# Patient Record
Sex: FEMALE | Race: OTHER | HISPANIC OR LATINO | Employment: FULL TIME | URBAN - METROPOLITAN AREA
[De-identification: names, ages, dates, MRNs, and addresses within clinical notes are randomized per-mention and may not be internally consistent; named-entity substitution may affect disease eponyms.]

---

## 2019-03-21 ENCOUNTER — OFFICE VISIT (OUTPATIENT)
Dept: URGENT CARE | Facility: MEDICAL CENTER | Age: 34
End: 2019-03-21

## 2019-03-21 VITALS
OXYGEN SATURATION: 98 % | RESPIRATION RATE: 16 BRPM | SYSTOLIC BLOOD PRESSURE: 140 MMHG | WEIGHT: 146 LBS | DIASTOLIC BLOOD PRESSURE: 74 MMHG | TEMPERATURE: 97 F | HEART RATE: 86 BPM

## 2019-03-21 DIAGNOSIS — R05.9 COUGH: ICD-10-CM

## 2019-03-21 DIAGNOSIS — J06.9 ACUTE URI: Primary | ICD-10-CM

## 2019-03-21 PROCEDURE — G0383 LEV 4 HOSP TYPE B ED VISIT: HCPCS | Performed by: NURSE PRACTITIONER

## 2019-03-21 RX ORDER — LORATADINE 10 MG/1
10 TABLET ORAL DAILY
COMMUNITY

## 2019-03-21 RX ORDER — AMOXICILLIN 500 MG/1
500 CAPSULE ORAL EVERY 12 HOURS SCHEDULED
COMMUNITY

## 2019-03-21 RX ORDER — BROMPHENIRAMINE MALEATE, PSEUDOEPHEDRINE HYDROCHLORIDE, AND DEXTROMETHORPHAN HYDROBROMIDE 2; 30; 10 MG/5ML; MG/5ML; MG/5ML
10 SYRUP ORAL 4 TIMES DAILY PRN
Qty: 120 ML | Refills: 0 | Status: SHIPPED | OUTPATIENT
Start: 2019-03-21

## 2019-03-21 NOTE — PROGRESS NOTES
[facility]        NAME: Princess Hernandez is a 26 y o  female  : 1985    MRN: [id number]  DATE: 2019  TIME: 12:43 PM    Assessment and Plan   Acute URI [J06 9]  1  Acute URI     2  Cough  brompheniramine-pseudoephedrine-DM 30-2-10 MG/5ML syrup         Patient Instructions     May alternate Tylenol and Ibuprofen as needed  Encourage fluids and rest    Saline nasal spray as needed  Humidify bedroom  Salt water gargles  Chloraseptic spray and lozenges as needed  Follow up with PCP in 3-5 days  Proceed to  ER if symptoms worsen  Chief Complaint     Chief Complaint   Patient presents with    Cold Like Symptoms     Patient relates has been sick for 3 days with cough, congestion, and nasal congestion  Denies fever  Started taking Amoxicillin 500 mg BID  Obtained antibiotic from [facility]  Taking Claritin 10 m daily  States, antibiotic is not working  History of Present Illness       Interpretor #[id number] used  Patient presents in office with "having problems breathing"  Also noting cold symptoms x 3 days, including yellow mucus, chest congestion, and feels tired with breathing  Decreased smell and no taste in mouth  Denies fevers, n/V/D   + body aches, mild in back + chills  No history of asthma  Nonsmoker  No seasonal allergies  Has been taking loratadine for sinus congestion  No flu vaccine this season  Has been in the [facility] for past 10 days  Has also been taking amoxicillin though reports it is not helping  Is a permanent resident in the [facility] now  Review of Systems   Review of Systems   Constitutional: Negative for fever  HENT: Positive for congestion and rhinorrhea  Negative for ear pain, sinus pressure, sinus pain and sore throat (Initially though resolved)  Respiratory: Positive for cough and shortness of breath  Negative for chest tightness and wheezing  Cardiovascular: Negative  Gastrointestinal: Negative      Musculoskeletal: Positive for myalgias  Skin: Negative for rash and wound  Current Medications       Current Outpatient Medications:     amoxicillin (AMOXIL) 500 mg capsule, Take 500 mg by mouth every 12 (twelve) hours, Disp: , Rfl:     loratadine (CLARITIN) 10 mg tablet, Take 10 mg by mouth daily, Disp: , Rfl:     brompheniramine-pseudoephedrine-DM 30-2-10 MG/5ML syrup, Take 10 mL by mouth 4 (four) times a day as needed for cough, Disp: 120 mL, Rfl: 0    Current Allergies     Allergies as of 03/21/2019 - Reviewed 03/21/2019   Allergen Reaction Noted    Dexamethasone Swelling 03/21/2019            The following portions of the patient's history were reviewed and updated as appropriate: allergies, current medications, past family history, past medical history, past social history, past surgical history and problem list      History reviewed  No pertinent past medical history  History reviewed  No pertinent surgical history  No family history on file  Medications have been verified  Objective   /74   Pulse 86   Temp (!) 97 °F (36 1 °C) (Tympanic)   Resp 16   Wt 66 2 kg (146 lb)   LMP 03/06/2019   SpO2 98%        Physical Exam     Physical Exam   Constitutional: She is oriented to person, place, and time  Vital signs are normal  She appears well-developed and well-nourished  She is cooperative  Non-toxic appearance  She does not have a sickly appearance  She does not appear ill  No distress  HENT:   Head: Normocephalic and atraumatic  Right Ear: Hearing, tympanic membrane, external ear and ear canal normal    Left Ear: Hearing, tympanic membrane, external ear and ear canal normal    Nose: Rhinorrhea present  No mucosal edema or sinus tenderness  Right sinus exhibits no maxillary sinus tenderness and no frontal sinus tenderness  Left sinus exhibits no maxillary sinus tenderness and no frontal sinus tenderness     Mouth/Throat: Uvula is midline, oropharynx is clear and moist and mucous membranes are normal  Normal dentition  No oropharyngeal exudate  Eyes: Pupils are equal, round, and reactive to light  Conjunctivae, EOM and lids are normal  Right eye exhibits no discharge  Left eye exhibits no discharge  Neck: Trachea normal and normal range of motion  No thyroid mass and no thyromegaly present  Cardiovascular: Normal rate, regular rhythm, S1 normal, S2 normal, normal heart sounds, intact distal pulses and normal pulses  Exam reveals no gallop and no friction rub  No murmur heard  Pulmonary/Chest: Effort normal and breath sounds normal  No respiratory distress  She has no wheezes  She has no rhonchi  She has no rales  She exhibits no tenderness  Musculoskeletal: Normal range of motion  She exhibits no edema  Lymphadenopathy:     She has no cervical adenopathy  Neurological: She is alert and oriented to person, place, and time  Skin: Skin is warm and dry  No rash noted  She is not diaphoretic  Psychiatric: She has a normal mood and affect  Her behavior is normal  Thought content normal    Nursing note and vitals reviewed

## 2019-03-21 NOTE — PATIENT INSTRUCTIONS
May alternate Tylenol and Ibuprofen as needed  Encourage fluids and rest    Saline nasal spray as needed  Humidify bedroom  Salt water gargles  Chloraseptic spray and lozenges as needed  F/U with PCP if symptoms persist/worsen or go to nearest emergency department if any signs of distress  Síntomas de resfriado   LO QUE NECESITA SABER:   Un resfriado es choco infección causada por un virus  La infección causa la inflamación del sistema respiratorio superior  Algunos síntomas comunes de un resfriado incluyen estornudos, garganta seca, congestión nasal, dolor de tong, ojos llorosos, y tos  La tos podría ser seca o incluso contener flemas  Usted también podría sentir demarco musculares, dolor en las articulaciones y cansancio  La fiebre no es un síntoma común del resfrío shayan podría suceder  La mayoría de los resfriados se [place] sin tratamiento  INSTRUCCIONES SOBRE EL VIRGIE HOSPITALARIA:   Regrese a la carlos de emergencias si:   · Usted siente más cansancio y debilidad  · Usted no puede comer  · Crandall corazón está latiendo [employer] rápido de lo normal en crandall avis  · Usted nota manchas george en la parte de atrás de crandall garganta y crandall faina está inflamado y adolorido al tacto  · Usted nota puntos rojos o morados pequeños o grandes en crandall piel  Pregúntele a crandall Ardie Shira vitaminas y minerales son adecuados para usted  · Usted tiene fiebre por encima de los 102°F (38 9°C)  · Usted tiene falta de la aire que es reciente o que MELLISSA  · Usted tiene choco secreción espesa saliéndole de la nariz por más de 2 días  · Sherlyn síntomas no mejoran o más aisha empeoran en cuestión de 5 días  · Usted tiene preguntas o inquietudes acerca de crandall condición o cuidado  Medicamentos:  Los [employer]  podrían  ser parte de los medicamentos sugeridos por crandall médico para disminuir los síntomas de crandall resfrío  Estos medicamentos están disponibles sin receta médica   Pregunte cuáles medicamentos renata y cuándo tomarlos  [facility]  · Los AINEs o el acetaminofén  ayudan a bajar la fiebre o disminuir el dolor  Los descongestionantes    · Los descongestionantes  ayudan a disminuir la congestión nasal      · Los antihistamínicos  ayudan a disminuir los estornudos y el flujo nasal      · Los jarabes para la tos  ayudan a disminuir la tos  · Los expectorantes  ayudan a aflojar las flemas para que las pueda toser  · [place] rae medicamentos félix se le haya indicado  Consulte con crandall médico si usted jabier que crandall medicamento no le está ayudando o si presenta efectos secundarios  Infórmele si es alérgico a algún medicamento  Mantenga choco lista actualizada de los [place], las vitaminas y los productos herbales que anshul  Incluya los siguientes datos de los medicamentos: cantidad, frecuencia y motivo de administración  Traiga con usted la lista o los envases de la píldoras a rae citas de seguimiento  Lleve la lista de los medicamentos con usted en avis de choco emergencia  Alivio de los síntomas:  Los siguientes podría ayudar a aliviar los síntomas del resfrío, félix por ejemplo la garganta seca y la congestión:  · Gárgaras con enjuague bucal o agua salada tibia según las instrucciones  · Pastillas para la garganta o golosinas duras  · Vaporizador de stephanie fría o tibia o humidificador para aliviar la respiración  · [place] de por lo menos 2 días y luego según lo necesario para ir eliminando el cansancio y la debilidad  · Póngase crema a base de petrolato alrededor de las fosas nasales para disminuir la irritación causada por estarse limpiando tanto la nariz  [place] líquidos:  Los líquidos le ayudarán a aflojar y disolver la mucosidad espesa para que la pueda expectorar  Los líquidos también lo mantendrán hidratado  Pregúntele a crandall médico cuáles líquidos le recomienda y cuánta cantidad debe renata cada día    Prevenga la propagación de gérmenes:  Es posible propagar los gérmenes del resfriado a otras personas por lo menos por 3 días después de [employer]  Lávese las joshua frecuentemente  No comparta artículos, félix utensilios de cocina  Cúbrase la nariz y la boca cuando tose o estornuda usando la parte de adentro del codo en vez de las joshua  Tire en la basura las toallas desechables que usó para limpiarse la nariz  No fume:  El fumar podría empeorar rae síntomas y aumentar la duración de harding resfriado  Hable con harding médico si necesita ayuda para dejar de fumar  Acuda a rae consultas de control con harding médico según le indicaron  Anote rae preguntas para que se acuerde de hacerlas elizabeth rae visitas  © 2017 [address] Information is for End User's use only and may not be sold, redistributed or otherwise used for commercial purposes  All illustrations and images included in CareNotes® are the copyrighted property of A D A M , Inc  or Jasmeet Moran  Esta información es sólo para uso en educación  Harding intención no es darle un consejo médico sobre enfermedades o tratamientos  Colsulte con harding Sendy Mall farmacéutico antes de seguir cualquier régimen médico para saber si es seguro y efectivo para usted

## 2019-05-17 ENCOUNTER — OFFICE VISIT (OUTPATIENT)
Dept: FAMILY MEDICINE CLINIC | Facility: CLINIC | Age: 34
End: 2019-05-17

## 2019-05-17 VITALS
OXYGEN SATURATION: 98 % | WEIGHT: 145.31 LBS | RESPIRATION RATE: 18 BRPM | HEIGHT: 60 IN | DIASTOLIC BLOOD PRESSURE: 80 MMHG | HEART RATE: 80 BPM | SYSTOLIC BLOOD PRESSURE: 122 MMHG | BODY MASS INDEX: 28.53 KG/M2 | TEMPERATURE: 97.5 F

## 2019-05-17 DIAGNOSIS — Z00.00 ANNUAL PHYSICAL EXAM: Primary | ICD-10-CM

## 2019-05-17 PROCEDURE — 99385 PREV VISIT NEW AGE 18-39: CPT | Performed by: PHYSICIAN ASSISTANT

## 2019-06-20 ENCOUNTER — TELEPHONE (OUTPATIENT)
Dept: FAMILY MEDICINE CLINIC | Facility: CLINIC | Age: 34
End: 2019-06-20

## 2019-07-12 ENCOUNTER — TELEPHONE (OUTPATIENT)
Dept: FAMILY MEDICINE CLINIC | Facility: CLINIC | Age: 34
End: 2019-07-12

## 2019-07-12 DIAGNOSIS — Z11.1 SCREENING FOR TUBERCULOSIS: Primary | ICD-10-CM

## 2019-07-12 NOTE — TELEPHONE ENCOUNTER
Pt stated [facility] and did not know she needed two  Pt stated she can go back for the second PPD if she needs to  Please advise, thanks

## 2019-07-12 NOTE — TELEPHONE ENCOUNTER
----- Message from Iglesia Valencia PA-C sent at 7/12/2019  8:27 AM EDT -----  Hi! Patient is primarily Cypriot-speaking  I received a form from this patient from [facility], and it looks like patient had a 1-step PPD performed, however, the form states the patient either needs a 2-step PPD or a chest x-ray  Can you please call patient and ask her if she had the 2 step PPD performed? If not, will order chest x-ray and will have patient complete that since that would be quicker at this point  Thanks!

## 2019-07-12 NOTE — TELEPHONE ENCOUNTER
Pt opted for the xray and was advised she can go any time she wants  Pt understands once you receive results and form is completed, we will call her to  form

## 2019-07-12 NOTE — TELEPHONE ENCOUNTER
Patient had her PPD test done in April so in order to have an accurate 2-step PPD, she would need to start the process over  If she would like to do that, she would need to have her baseline PPD test performed, then if negative, she will need her 2nd PPD performed 1-3 weeks later  Then, if that is negative as well, then she is uninfected  Please inform patient she can proceed with this process, but it will take at least 2-3 weeks to complete  As an alternative, she can perform the chest x-ray and receive results within 2-3 days afterwards  Let me know what she would like to do  If she proceeds with the 2 step PPD, then she will need to provide the test results to us so we can fully clear her  Thanks!

## 2019-07-16 ENCOUNTER — TRANSCRIBE ORDERS (OUTPATIENT)
Dept: ADMINISTRATIVE | Facility: HOSPITAL | Age: 34
End: 2019-07-16

## 2019-07-18 ENCOUNTER — TELEPHONE (OUTPATIENT)
Dept: FAMILY MEDICINE CLINIC | Facility: CLINIC | Age: 34
End: 2019-07-18

## 2019-07-18 ENCOUNTER — HOSPITAL ENCOUNTER (OUTPATIENT)
Dept: RADIOLOGY | Facility: HOSPITAL | Age: 34
Discharge: HOME | End: 2019-07-18
Payer: COMMERCIAL

## 2019-07-18 ENCOUNTER — APPOINTMENT (OUTPATIENT)
Dept: LAB | Facility: HOSPITAL | Age: 34
End: 2019-07-18
Payer: COMMERCIAL

## 2019-07-18 ENCOUNTER — TRANSCRIBE ORDERS (OUTPATIENT)
Dept: LAB | Facility: CLINIC | Age: 34
End: 2019-07-18

## 2019-07-18 DIAGNOSIS — Z00.00 ANNUAL PHYSICAL EXAM: ICD-10-CM

## 2019-07-18 DIAGNOSIS — Z11.1 SCREENING FOR TUBERCULOSIS: Primary | ICD-10-CM

## 2019-07-18 DIAGNOSIS — Z11.1 SCREENING FOR TUBERCULOSIS: ICD-10-CM

## 2019-07-18 LAB
ALBUMIN SERPL BCP-MCNC: 4 G/DL (ref 3–5.2)
ALP SERPL-CCNC: 47 U/L (ref 43–122)
ALT SERPL W P-5'-P-CCNC: 17 U/L (ref 9–52)
ANION GAP SERPL CALCULATED.3IONS-SCNC: 7 MMOL/L (ref 5–14)
AST SERPL W P-5'-P-CCNC: 20 U/L (ref 14–36)
BASOPHILS # BLD AUTO: 0 THOUSANDS/ΜL (ref 0–0.1)
BASOPHILS NFR BLD AUTO: 0 % (ref 0–1)
BILIRUB SERPL-MCNC: <0.1 MG/DL
BUN SERPL-MCNC: 12 MG/DL (ref 5–25)
CALCIUM SERPL-MCNC: 9.3 MG/DL (ref 8.4–10.2)
CHLORIDE SERPL-SCNC: 105 MMOL/L (ref 97–108)
CHOLEST SERPL-MCNC: 206 MG/DL
CO2 SERPL-SCNC: 25 MMOL/L (ref 22–30)
CREAT SERPL-MCNC: 0.67 MG/DL (ref 0.6–1.2)
EOSINOPHIL # BLD AUTO: 0.2 THOUSAND/ΜL (ref 0–0.4)
EOSINOPHIL NFR BLD AUTO: 3 % (ref 0–6)
ERYTHROCYTE [DISTWIDTH] IN BLOOD BY AUTOMATED COUNT: 14.8 %
EST. AVERAGE GLUCOSE BLD GHB EST-MCNC: 126 MG/DL
GFR SERPL CREATININE-BSD FRML MDRD: 113 ML/MIN/1.73SQ M
GLUCOSE P FAST SERPL-MCNC: 99 MG/DL (ref 70–99)
HBA1C MFR BLD: 6 % (ref 4.2–6.3)
HCT VFR BLD AUTO: 35.8 % (ref 36–46)
HDLC SERPL-MCNC: 34 MG/DL (ref 40–59)
HGB BLD-MCNC: 11.2 G/DL (ref 12–16)
LDLC SERPL CALC-MCNC: 127 MG/DL
LYMPHOCYTES # BLD AUTO: 2.2 THOUSANDS/ΜL (ref 0.5–4)
LYMPHOCYTES NFR BLD AUTO: 33 % (ref 25–45)
MCH RBC QN AUTO: 25.3 PG (ref 26–34)
MCHC RBC AUTO-ENTMCNC: 31.2 G/DL (ref 31–36)
MCV RBC AUTO: 81 FL (ref 80–100)
MONOCYTES # BLD AUTO: 0.3 THOUSAND/ΜL (ref 0.2–0.9)
MONOCYTES NFR BLD AUTO: 5 % (ref 1–10)
NEUTROPHILS # BLD AUTO: 4 THOUSANDS/ΜL (ref 1.8–7.8)
NEUTS SEG NFR BLD AUTO: 60 % (ref 45–65)
NONHDLC SERPL-MCNC: 172 MG/DL
PLATELET # BLD AUTO: 336 THOUSANDS/UL (ref 150–450)
PMV BLD AUTO: 8.3 FL (ref 8.9–12.7)
POTASSIUM SERPL-SCNC: 4.2 MMOL/L (ref 3.6–5)
PROT SERPL-MCNC: 7.1 G/DL (ref 5.9–8.4)
RBC # BLD AUTO: 4.42 MILLION/UL (ref 4–5.2)
SODIUM SERPL-SCNC: 137 MMOL/L (ref 137–147)
TRIGL SERPL-MCNC: 223 MG/DL
TSH SERPL DL<=0.05 MIU/L-ACNC: 1.33 UIU/ML (ref 0.47–4.68)
WBC # BLD AUTO: 6.8 THOUSAND/UL (ref 4.5–11)

## 2019-07-18 PROCEDURE — 80061 LIPID PANEL: CPT

## 2019-07-18 PROCEDURE — 71046 X-RAY EXAM CHEST 2 VIEWS: CPT

## 2019-07-18 PROCEDURE — 85025 COMPLETE CBC W/AUTO DIFF WBC: CPT

## 2019-07-18 PROCEDURE — 83036 HEMOGLOBIN GLYCOSYLATED A1C: CPT

## 2019-07-18 PROCEDURE — 84443 ASSAY THYROID STIM HORMONE: CPT

## 2019-07-18 PROCEDURE — 80053 COMPREHEN METABOLIC PANEL: CPT

## 2019-07-18 PROCEDURE — 36415 COLL VENOUS BLD VENIPUNCTURE: CPT

## 2019-07-18 NOTE — TELEPHONE ENCOUNTER
Patient stopped by the office this morning stating she tried to go get her chest x ray done she was told it was cancelled  She would like a call back to find out if the xray will be added again for her to go do it

## 2019-07-18 NOTE — TELEPHONE ENCOUNTER
Patient's form has been completed  It will be placed in the "Ready for " bin in the preceptor room  Thanks!

## 2019-07-22 ENCOUNTER — TELEPHONE (OUTPATIENT)
Dept: FAMILY MEDICINE CLINIC | Facility: CLINIC | Age: 34
End: 2019-07-22

## 2023-05-23 ENCOUNTER — HOSPITAL ENCOUNTER (OUTPATIENT)
Dept: MRI IMAGING | Facility: HOSPITAL | Age: 38
Discharge: HOME | End: 2023-05-23

## 2023-05-23 DIAGNOSIS — S13.9XXA NECK SPRAIN, INITIAL ENCOUNTER: ICD-10-CM

## 2023-07-13 ENCOUNTER — TELEPHONE (OUTPATIENT)
Dept: FAMILY MEDICINE CLINIC | Facility: CLINIC | Age: 38
End: 2023-07-13

## 2023-07-13 NOTE — TELEPHONE ENCOUNTER
Pt called nurse line requesting a call back. Called pt for further information and LVM to give us a call.